# Patient Record
Sex: MALE | Race: WHITE | NOT HISPANIC OR LATINO | Employment: OTHER | URBAN - NONMETROPOLITAN AREA
[De-identification: names, ages, dates, MRNs, and addresses within clinical notes are randomized per-mention and may not be internally consistent; named-entity substitution may affect disease eponyms.]

---

## 2023-12-01 ENCOUNTER — ANCILLARY PROCEDURE (OUTPATIENT)
Dept: RADIOLOGY | Facility: CLINIC | Age: 73
End: 2023-12-01
Payer: MEDICARE

## 2023-12-01 ENCOUNTER — OFFICE VISIT (OUTPATIENT)
Dept: PRIMARY CARE | Facility: CLINIC | Age: 73
End: 2023-12-01
Payer: MEDICARE

## 2023-12-01 VITALS
BODY MASS INDEX: 35.01 KG/M2 | RESPIRATION RATE: 18 BRPM | WEIGHT: 231 LBS | OXYGEN SATURATION: 96 % | SYSTOLIC BLOOD PRESSURE: 134 MMHG | HEART RATE: 69 BPM | DIASTOLIC BLOOD PRESSURE: 82 MMHG | HEIGHT: 68 IN

## 2023-12-01 DIAGNOSIS — E66.01 OBESITY, MORBID (MULTI): ICD-10-CM

## 2023-12-01 DIAGNOSIS — I15.9 SECONDARY HYPERTENSION: ICD-10-CM

## 2023-12-01 DIAGNOSIS — Z12.5 SCREENING PSA (PROSTATE SPECIFIC ANTIGEN): ICD-10-CM

## 2023-12-01 DIAGNOSIS — I48.91 ATRIAL FIBRILLATION, UNSPECIFIED TYPE (MULTI): ICD-10-CM

## 2023-12-01 DIAGNOSIS — M70.62 TROCHANTERIC BURSITIS OF LEFT HIP: ICD-10-CM

## 2023-12-01 DIAGNOSIS — M54.10 BACK PAIN WITH LEFT-SIDED RADICULOPATHY: ICD-10-CM

## 2023-12-01 DIAGNOSIS — Z13.220 LIPID SCREENING: ICD-10-CM

## 2023-12-01 DIAGNOSIS — Z00.00 MEDICARE ANNUAL WELLNESS VISIT, SUBSEQUENT: Primary | ICD-10-CM

## 2023-12-01 DIAGNOSIS — I25.5 ACC/AHA STAGE C CONGESTIVE HEART FAILURE DUE TO ISCHEMIC CARDIOMYOPATHY (MULTI): ICD-10-CM

## 2023-12-01 DIAGNOSIS — I50.9 ACC/AHA STAGE C CONGESTIVE HEART FAILURE DUE TO ISCHEMIC CARDIOMYOPATHY (MULTI): ICD-10-CM

## 2023-12-01 DIAGNOSIS — R93.89 ABNORMAL FINDINGS ON DIAGNOSTIC IMAGING OF OTHER SPECIFIED BODY STRUCTURES: ICD-10-CM

## 2023-12-01 DIAGNOSIS — Z00.00 ENCOUNTER FOR ANNUAL WELLNESS VISIT (AWV) IN MEDICARE PATIENT: ICD-10-CM

## 2023-12-01 PROBLEM — E66.8 MODERATE OBESITY: Status: ACTIVE | Noted: 2023-12-01

## 2023-12-01 PROBLEM — E66.9 MODERATE OBESITY: Status: ACTIVE | Noted: 2023-12-01

## 2023-12-01 PROBLEM — L25.9 CONTACT DERMATITIS: Status: ACTIVE | Noted: 2023-12-01

## 2023-12-01 PROBLEM — I50.22 CHRONIC SYSTOLIC HEART FAILURE (MULTI): Status: ACTIVE | Noted: 2023-12-01

## 2023-12-01 PROBLEM — L53.9 ERYTHEMA: Status: ACTIVE | Noted: 2023-12-01

## 2023-12-01 PROBLEM — I10 BENIGN ESSENTIAL HYPERTENSION: Status: ACTIVE | Noted: 2023-12-01

## 2023-12-01 PROBLEM — D50.9 IRON DEFICIENCY ANEMIA: Status: ACTIVE | Noted: 2023-12-01

## 2023-12-01 PROBLEM — I21.4 NON-ST ELEVATION MYOCARDIAL INFARCTION (NSTEMI), SUBSEQUENT EPISODE OF CARE (MULTI): Status: ACTIVE | Noted: 2023-12-01

## 2023-12-01 PROBLEM — E78.5 DYSLIPIDEMIA: Status: ACTIVE | Noted: 2023-12-01

## 2023-12-01 PROBLEM — I50.20: Status: ACTIVE | Noted: 2023-12-01

## 2023-12-01 PROBLEM — E55.9 VITAMIN D DEFICIENCY: Status: ACTIVE | Noted: 2023-12-01

## 2023-12-01 PROBLEM — K92.2 GASTROINTESTINAL HEMORRHAGE: Status: ACTIVE | Noted: 2018-08-30

## 2023-12-01 PROBLEM — K25.9 GASTRIC ULCER: Status: ACTIVE | Noted: 2023-12-01

## 2023-12-01 PROBLEM — E78.00 HYPERCHOLESTEROLEMIA: Status: ACTIVE | Noted: 2023-12-01

## 2023-12-01 PROBLEM — I25.10 3-VESSEL CORONARY ARTERY DISEASE: Status: ACTIVE | Noted: 2023-12-01

## 2023-12-01 PROBLEM — L03.119 CELLULITIS OF UPPER EXTREMITY: Status: ACTIVE | Noted: 2023-12-01

## 2023-12-01 PROCEDURE — 1160F RVW MEDS BY RX/DR IN RCRD: CPT | Performed by: INTERNAL MEDICINE

## 2023-12-01 PROCEDURE — 1036F TOBACCO NON-USER: CPT | Performed by: INTERNAL MEDICINE

## 2023-12-01 PROCEDURE — 73502 X-RAY EXAM HIP UNI 2-3 VIEWS: CPT | Mod: LEFT SIDE | Performed by: STUDENT IN AN ORGANIZED HEALTH CARE EDUCATION/TRAINING PROGRAM

## 2023-12-01 PROCEDURE — 3075F SYST BP GE 130 - 139MM HG: CPT | Performed by: INTERNAL MEDICINE

## 2023-12-01 PROCEDURE — 72110 X-RAY EXAM L-2 SPINE 4/>VWS: CPT | Mod: FY

## 2023-12-01 PROCEDURE — 99214 OFFICE O/P EST MOD 30 MIN: CPT | Performed by: INTERNAL MEDICINE

## 2023-12-01 PROCEDURE — 73502 X-RAY EXAM HIP UNI 2-3 VIEWS: CPT | Mod: LT

## 2023-12-01 PROCEDURE — 1170F FXNL STATUS ASSESSED: CPT | Performed by: INTERNAL MEDICINE

## 2023-12-01 PROCEDURE — 1159F MED LIST DOCD IN RCRD: CPT | Performed by: INTERNAL MEDICINE

## 2023-12-01 PROCEDURE — 72110 X-RAY EXAM L-2 SPINE 4/>VWS: CPT | Performed by: STUDENT IN AN ORGANIZED HEALTH CARE EDUCATION/TRAINING PROGRAM

## 2023-12-01 PROCEDURE — 1125F AMNT PAIN NOTED PAIN PRSNT: CPT | Performed by: INTERNAL MEDICINE

## 2023-12-01 PROCEDURE — 3079F DIAST BP 80-89 MM HG: CPT | Performed by: INTERNAL MEDICINE

## 2023-12-01 PROCEDURE — G0439 PPPS, SUBSEQ VISIT: HCPCS | Performed by: INTERNAL MEDICINE

## 2023-12-01 PROCEDURE — 3008F BODY MASS INDEX DOCD: CPT | Performed by: INTERNAL MEDICINE

## 2023-12-01 RX ORDER — PREDNISONE 20 MG/1
40 TABLET ORAL DAILY
Qty: 10 TABLET | Refills: 0 | Status: SHIPPED | OUTPATIENT
Start: 2023-12-01 | End: 2023-12-06

## 2023-12-01 RX ORDER — CELECOXIB 200 MG/1
200 CAPSULE ORAL DAILY
Qty: 15 CAPSULE | Refills: 0 | Status: SHIPPED | OUTPATIENT
Start: 2023-12-01 | End: 2023-12-16

## 2023-12-01 RX ORDER — TIZANIDINE 4 MG/1
4 TABLET ORAL NIGHTLY
Qty: 7 TABLET | Refills: 0 | OUTPATIENT
Start: 2023-12-01

## 2023-12-01 RX ORDER — RAMIPRIL 1.25 MG/1
1.25 CAPSULE ORAL DAILY
COMMUNITY
End: 2024-01-19

## 2023-12-01 RX ORDER — TIZANIDINE HYDROCHLORIDE 4 MG/1
4 CAPSULE, GELATIN COATED ORAL NIGHTLY PRN
Qty: 7 CAPSULE | Refills: 0 | Status: SHIPPED | OUTPATIENT
Start: 2023-12-01 | End: 2024-03-05 | Stop reason: WASHOUT

## 2023-12-01 RX ORDER — OMEPRAZOLE 20 MG/1
40 CAPSULE, DELAYED RELEASE ORAL DAILY
Qty: 42 CAPSULE | Refills: 0 | Status: SHIPPED | OUTPATIENT
Start: 2023-12-01 | End: 2024-04-10 | Stop reason: SDUPTHER

## 2023-12-01 RX ORDER — METOPROLOL SUCCINATE 200 MG/1
200 TABLET, EXTENDED RELEASE ORAL DAILY
COMMUNITY
End: 2024-01-19

## 2023-12-01 RX ORDER — NAPROXEN SODIUM 220 MG/1
1 TABLET, FILM COATED ORAL DAILY
COMMUNITY
Start: 2018-08-26

## 2023-12-01 ASSESSMENT — PAIN SCALES - GENERAL: PAINLEVEL: 4

## 2023-12-01 ASSESSMENT — PATIENT HEALTH QUESTIONNAIRE - PHQ9
SUM OF ALL RESPONSES TO PHQ9 QUESTIONS 1 AND 2: 0
1. LITTLE INTEREST OR PLEASURE IN DOING THINGS: NOT AT ALL
2. FEELING DOWN, DEPRESSED OR HOPELESS: NOT AT ALL

## 2023-12-01 ASSESSMENT — ACTIVITIES OF DAILY LIVING (ADL)
BATHING: INDEPENDENT
TAKING_MEDICATION: INDEPENDENT
GROCERY_SHOPPING: INDEPENDENT
MANAGING_FINANCES: INDEPENDENT
DOING_HOUSEWORK: INDEPENDENT
DRESSING: INDEPENDENT

## 2023-12-01 ASSESSMENT — ENCOUNTER SYMPTOMS
HIP PAIN: 1
TINGLING: 1
MUSCLE WEAKNESS: 1

## 2023-12-01 NOTE — PROGRESS NOTES
Patient ID:   Kingsley Weeks is a 73 y.o. male with PMH remarkable for vitamin d deficiency, 3v CAD (declines a CABG), pAFib, CHF, h/o GIB 2/2 gastric ulcer who presents to the office today for Medicare Annual Wellness Visit Subsequent and Hip Pain (left).    HEALTH MAINTENANCE: Annual Wellness Physical  Smoking: Former Smoker  Labs:  --> DUE  PSA: 0.5 in 2020    Hip Pain   The incident occurred more than 1 week ago. There was no injury mechanism. The pain is present in the left hip and left leg. The quality of the pain is described as shooting and aching. The pain is at a severity of 5/10. The pain is moderate. The pain has been Intermittent since onset. Associated symptoms include muscle weakness and tingling. He reports no foreign bodies present. The symptoms are aggravated by movement, palpation and weight bearing. He has tried immobilization and rest for the symptoms. The treatment provided no relief.      SOCIAL HISTORY:  Social History     Tobacco Use    Smoking status: Former     Types: Cigarettes     Quit date:      Years since quittin.9    Smokeless tobacco: Never   Substance Use Topics    Alcohol use: Never    Drug use: Never     REVIEW OF SYSTEMS:  Review of Systems   Neurological:  Positive for tingling.   All other systems reviewed and are negative.    ALLERGIES:  No Known Allergies     VITAL SIGNS:  Vitals:    23 1111   Resp: 18     Physical Exam  Vitals reviewed.   Constitutional:       General: He is not in acute distress.     Appearance: Normal appearance. He is not ill-appearing.   HENT:      Head: Normocephalic and atraumatic.      Right Ear: Tympanic membrane and external ear normal.      Left Ear: Tympanic membrane and external ear normal.      Nose: Nose normal.      Mouth/Throat:      Mouth: Mucous membranes are moist.      Pharynx: Oropharynx is clear.   Eyes:      Conjunctiva/sclera: Conjunctivae normal.      Pupils: Pupils are equal, round, and reactive to light.    Cardiovascular:      Rate and Rhythm: Normal rate and regular rhythm.      Heart sounds: Normal heart sounds. No murmur heard.  Pulmonary:      Effort: Pulmonary effort is normal. No respiratory distress.      Breath sounds: Normal breath sounds. No wheezing.   Abdominal:      General: There is distension.      Palpations: Abdomen is soft. There is no mass.      Tenderness: There is no abdominal tenderness.   Musculoskeletal:      Cervical back: Normal range of motion and neck supple.      Lumbar back: Tenderness present. Decreased range of motion.      Left hip: Tenderness present. Decreased range of motion.      Left lower leg: Tenderness present.   Skin:     General: Skin is warm and dry.   Neurological:      General: No focal deficit present.      Mental Status: He is alert and oriented to person, place, and time.      Sensory: No sensory deficit.      Motor: No weakness.      Coordination: Coordination normal.      Gait: Gait normal.   Psychiatric:         Mood and Affect: Mood normal.         Behavior: Behavior normal.     MEDICATIONS:  Current Outpatient Medications on File Prior to Visit   Medication Sig Dispense Refill    aspirin 81 mg chewable tablet Chew 1 tablet (81 mg) once daily.      atorvastatin (Lipitor) 40 mg tablet Take 1 tablet by mouth once daily 90 tablet 3    metoprolol succinate XL (Toprol-XL) 200 mg 24 hr tablet Take 1 tablet (200 mg) by mouth once daily.      ramipril (Altace) 1.25 mg capsule Take 1 capsule (1.25 mg) by mouth once daily.       No current facility-administered medications on file prior to visit.      LABORATORY DATA:  Lab Results   Component Value Date    WBC 12.9 (H) 01/10/2020    HGB 14.7 01/10/2020    HCT 46.3 01/10/2020     01/10/2020    CHOL 128 01/10/2020    TRIG 139 01/10/2020    HDL 28.0 (A) 01/10/2020    ALT 17 01/10/2020    AST 18 01/10/2020     01/10/2020    K 3.8 01/10/2020     01/10/2020    CREATININE 0.99 01/10/2020    BUN 23 01/10/2020     CO2 27 01/10/2020    INR 1.0 08/23/2018    HGBA1C 5.7 01/10/2020     ASSESSMENT AND PLAN:  Assessment/Plan   Diagnoses and all orders for this visit:  Encounter for annual wellness visit (AWV) in Medicare patient  -     CBC and Auto Differential; Future  -     Comprehensive Metabolic Panel; Future  -     TSH with reflex to Free T4 if abnormal; Future  -     Vitamin D 25-Hydroxy,Total (for eval of Vitamin D levels); Future  -     Vitamin B12; Future  Back pain with left-sided radiculopathy  -     omeprazole (PriLOSEC) 20 mg DR capsule; Take 2 capsules (40 mg) by mouth once daily for 21 days. Do not crush or chew.  -     predniSONE (Deltasone) 20 mg tablet; Take 2 tablets (40 mg) by mouth once daily for 5 days.  -     celecoxib (CeleBREX) 200 mg capsule; Take 1 capsule (200 mg) by mouth once daily for 15 days.  -     tiZANidine (Zanaflex) 4 mg capsule; Take 1 capsule (4 mg) by mouth as needed at bedtime for muscle spasms for up to 7 days.  Secondary hypertension  -     CBC and Auto Differential; Future  -     Comprehensive Metabolic Panel; Future  -     TSH with reflex to Free T4 if abnormal; Future  -     Vitamin D 25-Hydroxy,Total (for eval of Vitamin D levels); Future  -     Vitamin B12; Future  Screening PSA (prostate specific antigen)  -     Prostate Specific Antigen, Screen; Future  Lipid screening  -     Lipid Panel; Future  Abnormal findings on diagnostic imaging of other specified body structures  -     TSH with reflex to Free T4 if abnormal; Future  Body mass index (BMI) 35.0-35.9, adult  -     Vitamin D 25-Hydroxy,Total (for eval of Vitamin D levels); Future  Trochanteric bursitis of left hip  -     XR hip left with pelvis when performed 2 or 3 views; Future  -     XR lumbar spine complete 4+ views; Future    --------------------  Written by Nancy Guy RN, acting as a scribe for Dr. Sheridan. This note accurately reflects the work and decisions made by Dr. Sheridan.     I, Dr. Sheridan, attest all medical record  entries made by the scribe were under my direction and were personally dictated by me. I have reviewed the chart and agree that the record accurately reflects my performance of the history, physical exam, and assessment and plan.

## 2024-01-16 ENCOUNTER — APPOINTMENT (OUTPATIENT)
Dept: PRIMARY CARE | Facility: CLINIC | Age: 74
End: 2024-01-16
Payer: MEDICARE

## 2024-01-16 DIAGNOSIS — M70.62 TROCHANTERIC BURSITIS OF LEFT HIP: ICD-10-CM

## 2024-01-18 DIAGNOSIS — I48.91 ATRIAL FIBRILLATION, UNSPECIFIED TYPE (MULTI): Primary | ICD-10-CM

## 2024-01-19 DIAGNOSIS — I48.91 ATRIAL FIBRILLATION, UNSPECIFIED TYPE (MULTI): Primary | ICD-10-CM

## 2024-01-19 RX ORDER — METOPROLOL SUCCINATE 200 MG/1
200 TABLET, EXTENDED RELEASE ORAL DAILY
Qty: 90 TABLET | Refills: 0 | Status: SHIPPED | OUTPATIENT
Start: 2024-01-19 | End: 2024-01-19 | Stop reason: SDUPTHER

## 2024-01-19 RX ORDER — RAMIPRIL 1.25 MG/1
1.25 CAPSULE ORAL DAILY
Qty: 90 CAPSULE | Refills: 0 | Status: SHIPPED | OUTPATIENT
Start: 2024-01-19 | End: 2024-01-19 | Stop reason: SDUPTHER

## 2024-01-22 RX ORDER — METOPROLOL SUCCINATE 200 MG/1
200 TABLET, EXTENDED RELEASE ORAL DAILY
Qty: 90 TABLET | Refills: 0 | Status: SHIPPED | OUTPATIENT
Start: 2024-01-22 | End: 2024-04-10 | Stop reason: SDUPTHER

## 2024-01-22 RX ORDER — RAMIPRIL 1.25 MG/1
1.25 CAPSULE ORAL DAILY
Qty: 90 CAPSULE | Refills: 0 | Status: SHIPPED | OUTPATIENT
Start: 2024-01-22 | End: 2024-04-10 | Stop reason: SDUPTHER

## 2024-01-24 ENCOUNTER — EVALUATION (OUTPATIENT)
Dept: PHYSICAL THERAPY | Facility: HOSPITAL | Age: 74
End: 2024-01-24
Payer: MEDICARE

## 2024-01-24 DIAGNOSIS — M70.62 TROCHANTERIC BURSITIS OF LEFT HIP: Primary | ICD-10-CM

## 2024-01-24 PROCEDURE — 97161 PT EVAL LOW COMPLEX 20 MIN: CPT | Mod: GP | Performed by: PHYSICAL THERAPIST

## 2024-01-24 PROCEDURE — 97110 THERAPEUTIC EXERCISES: CPT | Mod: GP | Performed by: PHYSICAL THERAPIST

## 2024-01-24 ASSESSMENT — ENCOUNTER SYMPTOMS
LOSS OF SENSATION IN FEET: 0
OCCASIONAL FEELINGS OF UNSTEADINESS: 0
DEPRESSION: 0

## 2024-01-24 ASSESSMENT — ACTIVITIES OF DAILY LIVING (ADL): ADL_ASSISTANCE: INDEPENDENT

## 2024-01-24 ASSESSMENT — PAIN SCALES - GENERAL: PAINLEVEL_OUTOF10: 0 - NO PAIN

## 2024-01-24 ASSESSMENT — PAIN DESCRIPTION - DESCRIPTORS: DESCRIPTORS: OTHER (COMMENT)

## 2024-01-24 ASSESSMENT — PAIN - FUNCTIONAL ASSESSMENT: PAIN_FUNCTIONAL_ASSESSMENT: 0-10

## 2024-01-24 NOTE — PROGRESS NOTES
Physical Therapy    Physical Therapy Evaluation and Treatment      Patient Name: Kingsley Weeks  MRN: [id number]  Today's Date: 1/24/2024  Time Calculation  Start Time: 0923  Stop Time: 1000  Time Calculation (min): 37 min    Assessment:  PT Assessment  PT Assessment Results: Decreased strength, Decreased range of motion, Decreased mobility, Pain  Rehab Prognosis: Good  Evaluation/Treatment Tolerance: Patient tolerated treatment well   Patient demonstrated impaired strength, range of motion, and flexibility. Patient demonstrated difficulty fully extending his left knee due to hip pain. Patient demonstrated difficulty performing a posterior pelvic tilt requiring verbal cues and tactile cues to perform correctly. Skilled physical therapy is warranted to address the above stated impairments, so the patient can perform functional activities and work duties without increased pain or difficulty.    Plan:  OP PT Plan  Treatment/Interventions: Education/ Instruction, Gait training, Manual therapy, Therapeutic activities, Therapeutic exercises  PT Plan: Skilled PT  PT Frequency: 2 times per week  Duration: 6 weeks  Onset Date: 12/01/23  Certification Period Start Date: 01/24/24  Certification Period End Date: 04/17/24  Number of Treatments Authorized: 1 of 1  Rehab Potential: Good  Plan of Care Agreement: Patient    Current Problem:   1. Trochanteric bursitis of left hip  Referral to Physical Therapy          Subjective    General:  General  Reason for Referral: left hip pain  Referred By: Dr. Thompson  Patient is a 73 year old male presenting with left hip pain. Patient states that he's been having the pain for about 2 months.    Precautions:  Precautions  STEADI Fall Risk Score (The score of 4 or more indicates an increased risk of falling): 3    Pain:  Pain Assessment  Pain Assessment: 0-10  Pain Score: 0 - No pain (elevates to 4/10 during sit<->stand transfers)  Pain Location: Hip  Pain Orientation: Left (lateral)  Pain  Descriptors: Other (Comment) (pinching)  Pain Frequency: Intermittent  Clinical Progression: Gradually worsening  Patient's Stated Pain Goal: 2  Pain Interventions: Medication (See MAR), Heat applied    Home Living:   Patient lives with his wife in a single level house with 2 steps. Patient takes the steps 1 at a time and holds onto a rail.    Prior Level of Function:  Prior Function Per Pt/Caregiver Report  Level of [place]: Independent with ADLs and functional transfers, Independent with homemaking with ambulation  ADL Assistance: Independent  Homemaking Assistance: Independent  Ambulatory Assistance: Independent  Vocational: Retired  Hand Dominance: Left    Objective   Special Tests  Supine SLR: (Negative): + LLE  Slump: (Negative): negative BLE     Functional Rating Scale  LEFS   /80: 60    Lumbar AROM  Lumbar flexion: (60°): min restriction  Lumbar extension (25°): severe restriction (pinching in left lateral hip)  Lumbar rotation right (30°): min restriction  Lumbar rotation left (30°): min restriction  Lumbar sidebend right (25°): min restriction  Lumbar sidebend left (25°): min restriction    Hip PROM  R hip flexion: (125°): 105  L hip flexion: (125°): 105    Specific Lower Extremity MMT  R Iliopsoas: (5/5): 4/5  L Iliopsoas: (5/5): 4/5  R Gluteals (sidelying): (5/5): 4+/5  L Gluteals (sidelying): (5/5): 4+/5  R knee flexion: (5/5): 5/5  L knee flexion: (5/5): 5/5  R knee extension: (5/5): 4+/5  L knee extension: (5/5): 4+/5    Special Tests  LUMA: (Negative): + BLE    Flexibility  R hamstrings: -43  L hamstrings: -28    Knee AROM  R knee flexion: (140°): 108  L knee flexion: (140°): 105  R knee extension: (0°): 0  L knee extension: (0°): -3    Ankle MMT  R ankle dorsiflexion: (5/5): 4-/5  L ankle dorsiflexion: (5/5): 4-/5    Treatments:  Therapeutic Exercise  Therapeutic Exercise Performed: Yes  Therapeutic Exercise Activity 1: LTR x5  Therapeutic Exercise Activity 2: supine piriformis stretch with  "towel 2x15\"  Therapeutic Exercise Activity 3: PPT x5    Manual Therapy  Manual Therapy Performed: Yes  Manual Therapy Activity 1: BLE long limb distraction 3x30\"    EDUCATION:  Outpatient Education  Individual(s) Educated: Patient  Education Provided: Home Exercise Program, POC  Patient/Caregiver Demonstrated Understanding: yes  Plan of Care Discussed and Agreed Upon: yes  Patient Response to Education: Patient/Caregiver Verbalized Understanding of Information    Goals:  Active       PT Problem       Patient will ascend and descend 4-6 steps with rail modified independent and reciprocal pattern.       Start:  01/24/24    Expected End:  03/06/24            Patient will achieve bilateral knee ROM of  0-120 degrees for improved functional mobility.       Start:  01/24/24    Expected End:  03/06/24            Patient will achieve spinal flexion to WFL.       Start:  01/24/24    Expected End:  03/06/24            Patient will achieve spinal extension to min restriction without complaints of pain.       Start:  01/24/24    Expected End:  03/06/24            Patient will achieve bilateral spinal side bending ROM WFL.       Start:  01/24/24    Expected End:  03/06/24            Patient will achieve bilateral spinal rotation ROM WFL       Start:  01/24/24    Expected End:  03/06/24            Patient will achieve bilateral hip flexion strength of at least 4+/5 for improved functional mobility.       Start:  01/24/24    Expected End:  03/06/24            Patient will achieve bilateral ankle dorsiflexion strength of at least 4+/5 for improved functional mobility.       Start:  01/24/24    Expected End:  03/06/24            Patient will demonstrate independence in home program for support of progression       Start:  01/24/24    Expected End:  02/07/24            Patient will report pain of no more than 2/10 demonstrating a reduction of overall pain       Start:  01/24/24    Expected End:  03/06/24            Patient will show " a significant change in LEFS (60 to 69) patient reported outcome tool to demonstrate subjective imporovement       Start:  01/24/24    Expected End:  03/06/24

## 2024-01-25 ENCOUNTER — TELEPHONE (OUTPATIENT)
Dept: PHYSICAL THERAPY | Facility: HOSPITAL | Age: 74
End: 2024-01-25
Payer: MEDICARE

## 2024-01-31 ENCOUNTER — TREATMENT (OUTPATIENT)
Dept: PHYSICAL THERAPY | Facility: HOSPITAL | Age: 74
End: 2024-01-31
Payer: MEDICARE

## 2024-01-31 DIAGNOSIS — M70.62 TROCHANTERIC BURSITIS OF LEFT HIP: ICD-10-CM

## 2024-01-31 PROCEDURE — 97140 MANUAL THERAPY 1/> REGIONS: CPT | Mod: GP,CQ

## 2024-01-31 PROCEDURE — 97110 THERAPEUTIC EXERCISES: CPT | Mod: GP,CQ

## 2024-01-31 ASSESSMENT — PAIN - FUNCTIONAL ASSESSMENT: PAIN_FUNCTIONAL_ASSESSMENT: 0-10

## 2024-01-31 ASSESSMENT — PAIN SCALES - GENERAL: PAINLEVEL_OUTOF10: 4

## 2024-01-31 NOTE — PROGRESS NOTES
"Physical Therapy    Physical Therapy Treatment    Patient Name: Kingsley Weeks  MRN: [id number]  Today's Date: 1/31/2024  Time Calculation  Start Time: 1050 (pt late)  Stop Time: 1129  Time Calculation (min): 39 min      Assessment:Pt has difficulty relaxing the L LE to allow manual stretches/mobilizations, requires multiple cues with fair follow through demonstrated. Pt demonstrated tightness with L hip flexors, as he is not able to fully extend his L hip in supine, d/t c/o \"pulling\" in the anterior hip.  PT Assessment  PT Assessment Results: Decreased strength, Decreased range of motion, Decreased mobility, Pain  Rehab Prognosis: Good  Plan:Continue to progress current POC as tolerated to facilitate ability to perform functional activities.   OP PT Plan  PT Plan: Skilled PT  PT Frequency: 2 times per week  Duration: 6 weeks  Onset Date: 12/01/23  Certification Period Start Date: 01/24/24  Certification Period End Date: 04/17/24  Number of Treatments Authorized: Visit 2    Current Problem  1. Trochanteric bursitis of left hip  Follow Up In Physical Therapy          General  PT  Visit  PT Received On: 01/31/24  Response to Previous Treatment: Compliant with home exercise program  General  Reason for Referral: left hip pain  Referred By: Dr. Thompson    Subjective  Pt reports no changes with symptoms since last PT session.   Precautions     Vital Signs     Pain  Pain Assessment  Pain Assessment: 0-10  Pain Score: 4  Pain Location: Hip  Pain Orientation: Left  Pain Frequency: Intermittent    Objective   Cognition     Posture     Extremity/Trunk Assessment      Activity Tolerance:     Outcome Measures:    Treatments:  Therapeutic Exercise  Therapeutic Exercise Performed: Yes  Therapeutic Exercise Activity 1: LTR x 10 3\"  Therapeutic Exercise Activity 2: supine piriformis stretch with towel 3x 15\"  Therapeutic Exercise Activity 3: PPT x10  Therapeutic Exercise Activity 4: SKTC x3 15\"  Therapeutic Exercise Activity 5: " Hamstring stretch x 15  Therapeutic Exercise Activity 6: Iso Hip Adduction x 15  Therapeutic Exercise Activity 7: Iso Hip Abduction Blue x 15  Therapeutic Exercise Activity 8: Supine hip flexor stretch x2'    Manual Therapy  Manual Therapy Performed: Yes  Manual Therapy Activity 1: L long axis distraction to increase mobility  Manual Therapy Activity 2: L hip lateral distraction    OP EDUCATION:  Outpatient Education  Individual(s) Educated: Patient  Education Provided: Home Exercise Program (standing hip flexor stretch and hamstring stretch)  Patient Response to Education: Patient/Caregiver Verbalized Understanding of Information    Goals:  Active       PT Problem       Patient will ascend and descend 4-6 steps with rail modified independent and reciprocal pattern.       Start:  01/24/24    Expected End:  03/06/24            Patient will achieve bilateral knee ROM of  0-120 degrees for improved functional mobility.       Start:  01/24/24    Expected End:  03/06/24            Patient will achieve spinal flexion to WFL.       Start:  01/24/24    Expected End:  03/06/24            Patient will achieve spinal extension to min restriction without complaints of pain.       Start:  01/24/24    Expected End:  03/06/24            Patient will achieve bilateral spinal side bending ROM WFL.       Start:  01/24/24    Expected End:  03/06/24            Patient will achieve bilateral spinal rotation ROM WFL       Start:  01/24/24    Expected End:  03/06/24            Patient will achieve bilateral hip flexion strength of at least 4+/5 for improved functional mobility.       Start:  01/24/24    Expected End:  03/06/24            Patient will achieve bilateral ankle dorsiflexion strength of at least 4+/5 for improved functional mobility.       Start:  01/24/24    Expected End:  03/06/24            Patient will demonstrate independence in home program for support of progression       Start:  01/24/24    Expected End:  02/07/24             Patient will report pain of no more than 2/10 demonstrating a reduction of overall pain       Start:  01/24/24    Expected End:  03/06/24            Patient will show a significant change in LEFS (60 to 69) patient reported outcome tool to demonstrate subjective imporovement       Start:  01/24/24    Expected End:  03/06/24

## 2024-03-05 ENCOUNTER — OFFICE VISIT (OUTPATIENT)
Dept: CARDIOLOGY | Facility: CLINIC | Age: 74
End: 2024-03-05
Payer: MEDICARE

## 2024-03-05 VITALS
WEIGHT: 227 LBS | OXYGEN SATURATION: 96 % | BODY MASS INDEX: 34.4 KG/M2 | HEART RATE: 69 BPM | HEIGHT: 68 IN | DIASTOLIC BLOOD PRESSURE: 79 MMHG | SYSTOLIC BLOOD PRESSURE: 122 MMHG

## 2024-03-05 DIAGNOSIS — I10 PRIMARY HYPERTENSION: Primary | ICD-10-CM

## 2024-03-05 DIAGNOSIS — E78.5 DYSLIPIDEMIA: ICD-10-CM

## 2024-03-05 DIAGNOSIS — I50.9 ACC/AHA STAGE C CONGESTIVE HEART FAILURE DUE TO ISCHEMIC CARDIOMYOPATHY (MULTI): ICD-10-CM

## 2024-03-05 DIAGNOSIS — I48.0 PAROXYSMAL ATRIAL FIBRILLATION (MULTI): ICD-10-CM

## 2024-03-05 DIAGNOSIS — I25.10 3-VESSEL CORONARY ARTERY DISEASE: ICD-10-CM

## 2024-03-05 DIAGNOSIS — I25.5 ACC/AHA STAGE C CONGESTIVE HEART FAILURE DUE TO ISCHEMIC CARDIOMYOPATHY (MULTI): ICD-10-CM

## 2024-03-05 PROCEDURE — 99213 OFFICE O/P EST LOW 20 MIN: CPT | Performed by: NURSE PRACTITIONER

## 2024-03-05 PROCEDURE — 1125F AMNT PAIN NOTED PAIN PRSNT: CPT | Performed by: NURSE PRACTITIONER

## 2024-03-05 PROCEDURE — 3008F BODY MASS INDEX DOCD: CPT | Performed by: NURSE PRACTITIONER

## 2024-03-05 PROCEDURE — 1036F TOBACCO NON-USER: CPT | Performed by: NURSE PRACTITIONER

## 2024-03-05 PROCEDURE — 1159F MED LIST DOCD IN RCRD: CPT | Performed by: NURSE PRACTITIONER

## 2024-03-05 PROCEDURE — 3078F DIAST BP <80 MM HG: CPT | Performed by: NURSE PRACTITIONER

## 2024-03-05 PROCEDURE — 3074F SYST BP LT 130 MM HG: CPT | Performed by: NURSE PRACTITIONER

## 2024-03-05 NOTE — LETTER
March 6, 2024     Verenice Thompson MD  [address] [address]    Patient: Kingsley Weeks   YOB: 1950   Date of Visit: 3/5/2024       Dear Dr. Verenice Thompson MD:    Thank you for referring Kingsley Weeks to me for evaluation. Below are my notes for this consultation.  If you have questions, please do not hesitate to call me. I look forward to following your patient along with you.       Sincerely,     Cecilia Stover, APRN-CNP      CC: No Recipients  ______________________________________________________________________________________    Primary Care Physician: Verenice Thompson MD  Primary Cardiologist:       Date of Visit: 03/05/2024  2:40 PM EST  Location of visit:  [address]   Type of Visit: Follow up             Chief Complaint   Patient presents with   • Follow-up     Here for 1 year follow up  no concerns today        HPI / Summary:   Kingsley Weeks is a 73 y.o. male  with 3 Vessel CAD for which he declines CABG, ischemic cardiomyopathy, hx PAF in the setting of GI bleed (not anticoagulated), chronic HFpEF    who returns for routine follow up     No complaints and is  staying actives      He has lost some weight through diet and exercise.  Home BP readings consistently  120-130 mmhg systolic      12 system review is negative except as noted above         Medical History:   Past Medical History:   Diagnosis Date   • Congestive heart failure (CMS/HCC)        Social History:   Tobacco Use: Medium Risk (3/5/2024)    Patient History    • Smoking Tobacco Use: Former    • Smokeless Tobacco Use: Never    • Passive Exposure: Not on file         MEDICATIONS:   Current Outpatient Medications   Medication Instructions   • aspirin 81 mg chewable tablet 1 tablet, oral, Daily   • atorvastatin (LIPITOR) 40 mg, oral, Daily   • metoprolol succinate XL (TOPROL-XL) 200 mg, oral, Daily   • omeprazole (PRILOSEC) 40 mg, oral, Daily, Do not crush or chew.   • ramipril (ALTACE) 1.25 mg, oral, Daily   •  tiZANidine (ZANAFLEX) 4 mg, oral, Nightly PRN         IMAGING REVIEWED:    Cardiac catheterization 8/28/2018  CONCLUSIONS:   1. 100% mid LAD  with L-L and R-L collaterals.   2. 90% proximal RCA lesion.   3. Native LCx is a small vessel, gives rise to large OM1 with 60-70% stenosis.    Echocardiogram 8/20/2018  CONCLUSIONS:   1. The left ventricular systolic function is mildly decreased with a 45-50% estimated ejection fraction.   2. Poorly visualized anatomical structures due to suboptimal image quality.   3. Spectral Doppler shows an impaired relaxation pattern of left ventricular diastolic filling.   4. There is a septal-apical akinesis.          LABS:  CBC with Differential:    Lab Results   Component Value Date    WBC 12.9 (H) 01/10/2020    RBC 4.81 01/10/2020    HGB 14.7 01/10/2020    HCT 46.3 01/10/2020     01/10/2020    MCV 96 01/10/2020    MCHC 31.7 (L) 01/10/2020    RDW 13.9 01/10/2020    NRBC 0.1 08/29/2018    LYMPHOPCT 29.2 01/10/2020    LYMPHOPCT 21.0 08/28/2018    MONOPCT 9.1 01/10/2020    MONOPCT 4.0 08/28/2018    EOSPCT 2.2 01/10/2020    EOSPCT 1.0 08/28/2018    BASOPCT 0.5 01/10/2020    BASOPCT 0.0 08/28/2018    MONOSABS 1.18 (H) 01/10/2020    LYMPHSABS 3.77 01/10/2020    EOSABS 0.29 01/10/2020    EOSABS 0.15 08/28/2018    BASOSABS 0.07 01/10/2020    BASOSABS 0.00 08/28/2018     CMP:    Lab Results   Component Value Date     01/10/2020    K 3.8 01/10/2020     01/10/2020    CO2 27 01/10/2020    BUN 23 01/10/2020    CREATININE 0.99 01/10/2020    GLUCOSE 108 (H) 01/10/2020    PROT 6.8 01/10/2020    CALCIUM 9.0 01/10/2020    BILITOT 0.7 01/10/2020    ALKPHOS 91 01/10/2020    AST 18 01/10/2020    ALT 17 01/10/2020     BMP:    Lab Results   Component Value Date     01/10/2020    K 3.8 01/10/2020     01/10/2020    CO2 27 01/10/2020    BUN 23 01/10/2020    CREATININE 0.99 01/10/2020    CALCIUM 9.0 01/10/2020    GLUCOSE 108 (H) 01/10/2020     Magnesium:  Lab Results  "  Component Value Date    MG 2.21 08/23/2018     Troponin:  No results found for: \"TROPHS\"  BNP:   Lab Results   Component Value Date     (H) 08/23/2018         Lipid Panel:  Lab Results   Component Value Date    HDL 28.0 (A) 01/10/2020    CHHDL 4.6 01/10/2020    VLDL 28 01/10/2020    TRIG 139 01/10/2020        Lab work and imaging results independently reviewed by me     Visit Vitals  /79   Pulse 69   Ht 1.727 m (5' 8\")   Wt 103 kg (227 lb)   SpO2 96%   BMI 34.52 kg/m²   Smoking Status Former   BSA 2.22 m²              Constitutional:       Appearance: Healthy appearance. Not in distress.   Eyes:      Conjunctiva/sclera: Conjunctivae normal.   Neck:      Vascular: JVD normal.   Pulmonary:      Effort: Pulmonary effort is normal.      Breath sounds: Normal breath sounds.   Cardiovascular:      PMI at left midclavicular line. Normal rate. Regular rhythm. Normal S1. Normal S2.       Murmurs: There is no murmur.      No rub.   Pulses:     Intact distal pulses.   Edema:     Peripheral edema absent.   Abdominal:      General: Bowel sounds are normal.   Musculoskeletal:      Cervical back: Neck supple. Skin:     General: Skin is warm and dry.   Neurological:      Mental Status: Alert and oriented to person, place and time.           Problem List Items Addressed This Visit             ICD-10-CM    3-vessel coronary artery disease I25.10    ACC/AHA stage C congestive heart failure due to ischemic cardiomyopathy (CMS/HCC) I50.9, I25.5    Atrial fibrillation (CMS/HCC) I48.91    Dyslipidemia E78.5    Hypertension - Primary I10         Mr. Moeller is doing well from a CV perspective without angina or symptoms suggestive of decompensated HF     BP is in acceptable range on current RX which He is tolerating well and agrees to continue   -- Ramipril 1.25 mg   --Toprol  mg     Most recent LDL 72 mg/dL with medically managed ASCVD   -- Atorvastatin 40 mg HS   --ASA 81 mg     Mediterranean / DASH diet   150 minutes " of symptom limited exercise / week        03/05/24 at 3:17 PM - ENEIDA Mckenna-CNP      Orders:  No orders of the defined types were placed in this encounter.        Followup Appts:  No future appointments.

## 2024-03-05 NOTE — PROGRESS NOTES
Primary Care Physician: Verenice Thompson MD  Primary Cardiologist:       Date of Visit: 03/05/2024  2:40 PM EST  Location of visit:  [facility]   Type of Visit: Follow up             Chief Complaint   Patient presents with    Follow-up     Here for 1 year follow up  no concerns today        HPI / Summary:   Kingsley Weeks is a 73 y.o. male  with 3 Vessel CAD for which he declines CABG, ischemic cardiomyopathy, hx PAF in the setting of GI bleed (not anticoagulated), chronic HFpEF    who returns for routine follow up     No complaints and is  staying actives      He has lost some weight through diet and exercise.  Home BP readings consistently  120-130 mmhg systolic      12 system review is negative except as noted above         Medical History:   Past Medical History:   Diagnosis Date    Congestive heart failure (CMS/HCC)        Social History:   Tobacco Use: Medium Risk (3/5/2024)    Patient History     Smoking Tobacco Use: Former     Smokeless Tobacco Use: Never     Passive Exposure: Not on file         MEDICATIONS:   Current Outpatient Medications   Medication Instructions    aspirin 81 mg chewable tablet 1 tablet, oral, Daily    atorvastatin (LIPITOR) 40 mg, oral, Daily    metoprolol succinate XL (TOPROL-XL) 200 mg, oral, Daily    omeprazole (PRILOSEC) 40 mg, oral, Daily, Do not crush or chew.    ramipril (ALTACE) 1.25 mg, oral, Daily    tiZANidine (ZANAFLEX) 4 mg, oral, Nightly PRN         IMAGING REVIEWED:    Cardiac catheterization 8/28/2018  CONCLUSIONS:   1. 100% mid LAD  with L-L and R-L collaterals.   2. 90% proximal RCA lesion.   3. Native LCx is a small vessel, gives rise to large OM1 with 60-70% stenosis.    Echocardiogram 8/20/2018  CONCLUSIONS:   1. The left ventricular systolic function is mildly decreased with a 45-50% estimated ejection fraction.   2. Poorly visualized anatomical structures due to suboptimal image quality.   3. Spectral Doppler shows an impaired relaxation pattern of left  "ventricular diastolic filling.   4. There is a septal-apical akinesis.          LABS:  CBC with Differential:    Lab Results   Component Value Date    WBC 12.9 (H) 01/10/2020    RBC 4.81 01/10/2020    HGB 14.7 01/10/2020    HCT 46.3 01/10/2020     01/10/2020    MCV 96 01/10/2020    MCHC 31.7 (L) 01/10/2020    RDW 13.9 01/10/2020    NRBC 0.1 08/29/2018    LYMPHOPCT 29.2 01/10/2020    LYMPHOPCT 21.0 08/28/2018    MONOPCT 9.1 01/10/2020    MONOPCT 4.0 08/28/2018    EOSPCT 2.2 01/10/2020    EOSPCT 1.0 08/28/2018    BASOPCT 0.5 01/10/2020    BASOPCT 0.0 08/28/2018    MONOSABS 1.18 (H) 01/10/2020    LYMPHSABS 3.77 01/10/2020    EOSABS 0.29 01/10/2020    EOSABS 0.15 08/28/2018    BASOSABS 0.07 01/10/2020    BASOSABS 0.00 08/28/2018     CMP:    Lab Results   Component Value Date     01/10/2020    K 3.8 01/10/2020     01/10/2020    CO2 27 01/10/2020    BUN 23 01/10/2020    CREATININE 0.99 01/10/2020    GLUCOSE 108 (H) 01/10/2020    PROT 6.8 01/10/2020    CALCIUM 9.0 01/10/2020    BILITOT 0.7 01/10/2020    ALKPHOS 91 01/10/2020    AST 18 01/10/2020    ALT 17 01/10/2020     BMP:    Lab Results   Component Value Date     01/10/2020    K 3.8 01/10/2020     01/10/2020    CO2 27 01/10/2020    BUN 23 01/10/2020    CREATININE 0.99 01/10/2020    CALCIUM 9.0 01/10/2020    GLUCOSE 108 (H) 01/10/2020     Magnesium:  Lab Results   Component Value Date    MG 2.21 08/23/2018     Troponin:  No results found for: \"TROPHS\"  BNP:   Lab Results   Component Value Date     (H) 08/23/2018         Lipid Panel:  Lab Results   Component Value Date    HDL 28.0 (A) 01/10/2020    CHHDL 4.6 01/10/2020    VLDL 28 01/10/2020    TRIG 139 01/10/2020        Lab work and imaging results independently reviewed by me     Visit Vitals  /79   Pulse 69   Ht 1.727 m (5' 8\")   Wt 103 kg (227 lb)   SpO2 96%   BMI 34.52 kg/m²   Smoking Status Former   BSA 2.22 m²              Constitutional:       Appearance: Healthy " appearance. Not in distress.   Eyes:      Conjunctiva/sclera: Conjunctivae normal.   Neck:      Vascular: JVD normal.   Pulmonary:      Effort: Pulmonary effort is normal.      Breath sounds: Normal breath sounds.   Cardiovascular:      PMI at left midclavicular line. Normal rate. Regular rhythm. Normal S1. Normal S2.       Murmurs: There is no murmur.      No rub.   Pulses:     Intact distal pulses.   Edema:     Peripheral edema absent.   Abdominal:      General: Bowel sounds are normal.   Musculoskeletal:      Cervical back: Neck supple. Skin:     General: Skin is warm and dry.   Neurological:      Mental Status: Alert and oriented to person, place and time.           Problem List Items Addressed This Visit             ICD-10-CM    3-vessel coronary artery disease I25.10    ACC/AHA stage C congestive heart failure due to ischemic cardiomyopathy (CMS/HCC) I50.9, I25.5    Atrial fibrillation (CMS/HCC) I48.91    Dyslipidemia E78.5    Hypertension - Primary I10         Mr. Moeller is doing well from a CV perspective without angina or symptoms suggestive of decompensated HF     BP is in acceptable range on current RX which He is tolerating well and agrees to continue   -- Ramipril 1.25 mg   --Toprol  mg     Most recent LDL 72 mg/dL with medically managed ASCVD   -- Atorvastatin 40 mg HS   --ASA 81 mg     Mediterranean / DASH diet   150 minutes of symptom limited exercise / week        03/05/24 at 3:17 PM - ENEIDA Mckenna-CNP      Orders:  No orders of the defined types were placed in this encounter.        Followup Appts:  No future appointments.

## 2024-03-06 NOTE — PATIENT INSTRUCTIONS
Follow up cardiology in 1 year; call sooner if problems arise   Continue routine follow up with your primary care provider

## 2024-03-25 ENCOUNTER — DOCUMENTATION (OUTPATIENT)
Dept: PHYSICAL THERAPY | Facility: HOSPITAL | Age: 74
End: 2024-03-25
Payer: MEDICARE

## 2024-04-10 DIAGNOSIS — I48.91 ATRIAL FIBRILLATION, UNSPECIFIED TYPE (MULTI): ICD-10-CM

## 2024-04-10 DIAGNOSIS — M54.10 BACK PAIN WITH LEFT-SIDED RADICULOPATHY: ICD-10-CM

## 2024-04-10 RX ORDER — OMEPRAZOLE 20 MG/1
40 CAPSULE, DELAYED RELEASE ORAL DAILY
Qty: 90 CAPSULE | Refills: 3 | Status: SHIPPED | OUTPATIENT
Start: 2024-04-10

## 2024-04-10 RX ORDER — METOPROLOL SUCCINATE 200 MG/1
200 TABLET, EXTENDED RELEASE ORAL DAILY
Qty: 90 TABLET | Refills: 3 | Status: SHIPPED | OUTPATIENT
Start: 2024-04-10

## 2024-04-10 RX ORDER — RAMIPRIL 1.25 MG/1
1.25 CAPSULE ORAL DAILY
Qty: 90 CAPSULE | Refills: 3 | Status: SHIPPED | OUTPATIENT
Start: 2024-04-10

## 2024-11-27 DIAGNOSIS — I10 PRIMARY HYPERTENSION: ICD-10-CM

## 2024-11-27 DIAGNOSIS — E78.5 DYSLIPIDEMIA: Primary | ICD-10-CM

## 2024-11-27 RX ORDER — ATORVASTATIN CALCIUM 40 MG/1
40 TABLET, FILM COATED ORAL DAILY
Qty: 90 TABLET | Refills: 3 | Status: SHIPPED | OUTPATIENT
Start: 2024-11-27

## 2025-04-22 DIAGNOSIS — I48.91 ATRIAL FIBRILLATION, UNSPECIFIED TYPE (MULTI): ICD-10-CM

## 2025-04-22 RX ORDER — RAMIPRIL 1.25 MG/1
1.25 CAPSULE ORAL DAILY
Qty: 90 CAPSULE | Refills: 0 | Status: SHIPPED | OUTPATIENT
Start: 2025-04-22

## 2025-05-12 DIAGNOSIS — E78.5 DYSLIPIDEMIA: ICD-10-CM

## 2025-05-12 DIAGNOSIS — I10 PRIMARY HYPERTENSION: ICD-10-CM

## 2025-05-12 DIAGNOSIS — I48.91 ATRIAL FIBRILLATION, UNSPECIFIED TYPE (MULTI): ICD-10-CM

## 2025-05-12 DIAGNOSIS — M54.10 BACK PAIN WITH LEFT-SIDED RADICULOPATHY: ICD-10-CM

## 2025-05-12 RX ORDER — OMEPRAZOLE 20 MG/1
40 CAPSULE, DELAYED RELEASE ORAL DAILY
Qty: 90 CAPSULE | Refills: 3 | Status: SHIPPED | OUTPATIENT
Start: 2025-05-12

## 2025-05-12 RX ORDER — ATORVASTATIN CALCIUM 40 MG/1
40 TABLET, FILM COATED ORAL DAILY
Qty: 90 TABLET | Refills: 3 | Status: SHIPPED | OUTPATIENT
Start: 2025-05-12

## 2025-05-12 RX ORDER — RAMIPRIL 1.25 MG/1
1.25 CAPSULE ORAL DAILY
Qty: 90 CAPSULE | Refills: 0 | Status: SHIPPED | OUTPATIENT
Start: 2025-05-12

## 2025-05-12 RX ORDER — METOPROLOL SUCCINATE 200 MG/1
200 TABLET, EXTENDED RELEASE ORAL DAILY
Qty: 90 TABLET | Refills: 3 | Status: SHIPPED | OUTPATIENT
Start: 2025-05-12

## 2025-10-02 ENCOUNTER — APPOINTMENT (OUTPATIENT)
Dept: CARDIOLOGY | Facility: CLINIC | Age: 75
End: 2025-10-02
Payer: MEDICARE